# Patient Record
Sex: FEMALE | Race: WHITE | URBAN - METROPOLITAN AREA
[De-identification: names, ages, dates, MRNs, and addresses within clinical notes are randomized per-mention and may not be internally consistent; named-entity substitution may affect disease eponyms.]

---

## 2017-03-07 ENCOUNTER — OFFICE VISIT (OUTPATIENT)
Dept: URGENT CARE | Facility: URGENT CARE | Age: 42
End: 2017-03-07
Payer: COMMERCIAL

## 2017-03-07 VITALS
SYSTOLIC BLOOD PRESSURE: 102 MMHG | TEMPERATURE: 98 F | HEART RATE: 79 BPM | WEIGHT: 160 LBS | OXYGEN SATURATION: 100 % | DIASTOLIC BLOOD PRESSURE: 76 MMHG

## 2017-03-07 DIAGNOSIS — R10.11 ABDOMINAL PAIN, RIGHT UPPER QUADRANT: Primary | ICD-10-CM

## 2017-03-07 PROCEDURE — 99201 ZZC OFFICE/OUTPT VISIT, NEW, LEVEL I: CPT | Performed by: PHYSICIAN ASSISTANT

## 2017-03-07 NOTE — MR AVS SNAPSHOT
"              After Visit Summary   3/7/2017    Sophia Hahn    MRN: [id number]           Patient Information     Date Of Birth          1975        Visit Information        Provider Department      3/7/2017 9:30 AM Sarah Jackson PA-C [facility]        Today's Diagnoses     Abdominal pain, right upper quadrant    -  1       Follow-ups after your visit        Who to contact     If you have questions or need follow up information about today's clinic visit or your schedule please contact [facility] directly at [phone].  Normal or non-critical lab and imaging results will be communicated to you by MyChart, letter or phone within 4 business days after the clinic has received the results. If you do not hear from us within 7 days, please contact the clinic through [employer] or phone. If you have a critical or abnormal lab result, we will notify you by phone as soon as possible.  Submit refill requests through [employer] or call your pharmacy and they will forward the refill request to us. Please allow 3 business days for your refill to be completed.          Additional Information About Your Visit        MyChart Information     [employer] lets you send messages to your doctor, view your test results, renew your prescriptions, schedule appointments and more. To sign up, go to [email or web address] . Click on \"Log in\" on the left side of the screen, which will take you to the Welcome page. Then click on \"Sign up Now\" on the right side of the page.     You will be asked to enter the access code listed below, as well as some personal information. Please follow the directions to create your username and password.     Your access code is: [email or web address]  Expires: 2017 10:04 AM     Your access code will  in 90 days. If you need help or a new code, please call your [place] clinic or [phone].        Care EveryWhere ID     This is your Care " EveryWhere ID. This could be used by other organizations to access your [place] medical records  [id number]        Your Vitals Were     Pulse Temperature Last Period Pulse Oximetry          79 98  F (36.7  C) (Oral) 02/13/2017 100%         Blood Pressure from Last 3 Encounters:   03/07/17 102/76    Weight from Last 3 Encounters:   03/07/17 160 lb (72.6 kg)              Today, you had the following     No orders found for display       Primary Care Provider    None Specified       No primary provider on file.        Thank you!     Thank you for choosing [facility]  for your care. Our goal is always to provide you with excellent care. Hearing back from our patients is one way we can continue to improve our services. Please take a few minutes to complete the written survey that you may receive in the mail after your visit with us. Thank you!             Your Updated Medication List - Protect others around you: Learn how to safely use, store and throw away your medicines at www.disposemymeds.org.      Notice  As of 3/7/2017 10:04 AM    You have not been prescribed any medications.

## 2017-03-07 NOTE — NURSING NOTE
Chief Complaint   Patient presents with     Abdominal Pain     upper abd pain started yesterday,rates at 6/10,denies any vomiting.       Initial /76 (BP Location: Left arm, Patient Position: Chair, Cuff Size: Adult Regular)  Pulse 79  Temp 98  F (36.7  C) (Oral)  Wt 160 lb (72.6 kg)  LMP 02/13/2017  SpO2 100% There is no height or weight on file to calculate BMI.  Medication Reconciliation: complete   Isabella HINES    '

## 2017-03-07 NOTE — PROGRESS NOTES
"Sophia Hahn is a 41 year old female who presents to  with right UPPER abdominal pain for the past 18-24 hours.  Denies any nausea or vomiting.  Pain is \"not that bad, a 6/10\" on a 0-10 pain scale, not crampy or sharpy.    Pain starts in epigastric area and radiates to right upper abdomen.    No recent alcohol or fatty food ingestion.    PMH: No h/o gall bladder disease or pancreatitis or liver issues.      SH: Patient is in [place] for work through end of week, then travels back to [place].    No exam was done in clinic today.    (R10.11) Abdominal pain, right upper quadrant  (primary encounter diagnosis)  Comment:  Symptoms seem consistent with a possible etiology of underlying gall bladder issues vs pancreas vs ?  Plan:   Discussed potential work up in urgent care today with labs to evaluate her symptoms, with the potential for further imaging which is not available in urgent care.  Patient was not interested in doing any lab work today, instead prefers to monitor her symptoms.  If her symptoms worsen in any way, she will seek evaluation in the ED where labs and imaging are available simultaneously.      Patient expresses understanding and agreement with the assessment and plan as above.      "

## 2020-07-29 ENCOUNTER — NEW PATIENT (OUTPATIENT)
Dept: URBAN - METROPOLITAN AREA CLINIC 43 | Facility: CLINIC | Age: 45
End: 2020-07-29
Payer: COMMERCIAL

## 2020-07-29 DIAGNOSIS — H16.223 KERATOCONJUNCTIVITIS SICCA, BILATERAL: Primary | ICD-10-CM

## 2020-07-29 PROCEDURE — 92134 CPTRZ OPH DX IMG PST SGM RTA: CPT | Performed by: OPTOMETRIST

## 2020-07-29 PROCEDURE — 92004 COMPRE OPH EXAM NEW PT 1/>: CPT | Performed by: OPTOMETRIST

## 2020-07-29 ASSESSMENT — KERATOMETRY
OS: 44.88
OD: 44.50

## 2020-07-29 ASSESSMENT — INTRAOCULAR PRESSURE
OS: 13
OD: 12

## 2020-07-29 ASSESSMENT — VISUAL ACUITY
OS: 20/15
OD: 20/20